# Patient Record
Sex: FEMALE | Race: WHITE | Employment: FULL TIME | URBAN - METROPOLITAN AREA
[De-identification: names, ages, dates, MRNs, and addresses within clinical notes are randomized per-mention and may not be internally consistent; named-entity substitution may affect disease eponyms.]

---

## 2018-05-17 ENCOUNTER — OFFICE VISIT (OUTPATIENT)
Dept: ORTHOPEDIC SURGERY | Age: 58
End: 2018-05-17

## 2018-05-17 VITALS — HEIGHT: 62 IN

## 2018-05-17 DIAGNOSIS — S46.812A TRAPEZIUS STRAIN, LEFT, INITIAL ENCOUNTER: ICD-10-CM

## 2018-05-17 DIAGNOSIS — M25.512 LEFT SHOULDER PAIN, UNSPECIFIED CHRONICITY: Primary | ICD-10-CM

## 2018-05-17 PROCEDURE — 99214 OFFICE O/P EST MOD 30 MIN: CPT | Performed by: ORTHOPAEDIC SURGERY

## 2018-05-17 RX ORDER — METHYLPREDNISOLONE 4 MG/1
TABLET ORAL
Qty: 1 KIT | Refills: 0 | Status: SHIPPED | OUTPATIENT
Start: 2018-05-17

## 2018-05-17 RX ORDER — MELOXICAM 15 MG/1
15 TABLET ORAL DAILY
Qty: 30 TABLET | Refills: 3 | Status: SHIPPED | OUTPATIENT
Start: 2018-05-17

## 2019-02-18 ENCOUNTER — APPOINTMENT (OUTPATIENT)
Dept: GENERAL RADIOLOGY | Age: 59
End: 2019-02-18
Payer: COMMERCIAL

## 2019-02-18 ENCOUNTER — HOSPITAL ENCOUNTER (EMERGENCY)
Age: 59
Discharge: HOME | End: 2019-02-18
Payer: COMMERCIAL

## 2019-02-18 VITALS
HEART RATE: 76 BPM | RESPIRATION RATE: 18 BRPM | SYSTOLIC BLOOD PRESSURE: 153 MMHG | BODY MASS INDEX: 29.44 KG/M2 | HEIGHT: 62 IN | WEIGHT: 160 LBS | TEMPERATURE: 97.9 F | OXYGEN SATURATION: 100 % | DIASTOLIC BLOOD PRESSURE: 78 MMHG

## 2019-02-18 DIAGNOSIS — S43.005A DISLOCATED SHOULDER, LEFT, INITIAL ENCOUNTER: Primary | ICD-10-CM

## 2019-02-18 PROCEDURE — 6370000000 HC RX 637 (ALT 250 FOR IP): Performed by: PHYSICIAN ASSISTANT

## 2019-02-18 PROCEDURE — 73030 X-RAY EXAM OF SHOULDER: CPT

## 2019-02-18 PROCEDURE — 73020 X-RAY EXAM OF SHOULDER: CPT

## 2019-02-18 PROCEDURE — 99283 EMERGENCY DEPT VISIT LOW MDM: CPT

## 2019-02-18 RX ORDER — HYDROCODONE BITARTRATE AND ACETAMINOPHEN 5; 325 MG/1; MG/1
1 TABLET ORAL ONCE
Status: COMPLETED | OUTPATIENT
Start: 2019-02-18 | End: 2019-02-18

## 2019-02-18 RX ORDER — HYDROCODONE BITARTRATE AND ACETAMINOPHEN 5; 325 MG/1; MG/1
1 TABLET ORAL EVERY 6 HOURS PRN
Qty: 10 TABLET | Refills: 0 | Status: SHIPPED | OUTPATIENT
Start: 2019-02-18 | End: 2019-02-21

## 2019-02-18 RX ADMIN — HYDROCODONE BITARTRATE AND ACETAMINOPHEN 1 TABLET: 5; 325 TABLET ORAL at 20:46

## 2019-02-18 ASSESSMENT — PAIN SCALES - GENERAL
PAINLEVEL_OUTOF10: 9
PAINLEVEL_OUTOF10: 9

## 2020-12-08 ENCOUNTER — APPOINTMENT (OUTPATIENT)
Dept: GENERAL RADIOLOGY | Age: 60
End: 2020-12-08
Payer: COMMERCIAL

## 2020-12-08 ENCOUNTER — HOSPITAL ENCOUNTER (EMERGENCY)
Age: 60
Discharge: HOME | End: 2020-12-08
Attending: EMERGENCY MEDICINE
Payer: COMMERCIAL

## 2020-12-08 VITALS
OXYGEN SATURATION: 99 % | WEIGHT: 176 LBS | HEART RATE: 80 BPM | TEMPERATURE: 98.9 F | DIASTOLIC BLOOD PRESSURE: 75 MMHG | HEIGHT: 62 IN | RESPIRATION RATE: 16 BRPM | BODY MASS INDEX: 32.39 KG/M2 | SYSTOLIC BLOOD PRESSURE: 154 MMHG

## 2020-12-08 PROCEDURE — 99284 EMERGENCY DEPT VISIT MOD MDM: CPT

## 2020-12-08 PROCEDURE — 73030 X-RAY EXAM OF SHOULDER: CPT

## 2020-12-08 PROCEDURE — 6370000000 HC RX 637 (ALT 250 FOR IP): Performed by: EMERGENCY MEDICINE

## 2020-12-08 RX ORDER — IBUPROFEN 200 MG
600 TABLET ORAL ONCE
Status: COMPLETED | OUTPATIENT
Start: 2020-12-08 | End: 2020-12-08

## 2020-12-08 RX ADMIN — IBUPROFEN 600 MG: 200 TABLET, FILM COATED ORAL at 17:33

## 2020-12-08 ASSESSMENT — PAIN DESCRIPTION - PAIN TYPE: TYPE: ACUTE PAIN

## 2020-12-08 ASSESSMENT — PAIN SCALES - GENERAL
PAINLEVEL_OUTOF10: 3
PAINLEVEL_OUTOF10: 10

## 2020-12-08 ASSESSMENT — PAIN DESCRIPTION - DESCRIPTORS: DESCRIPTORS: ACHING

## 2020-12-08 ASSESSMENT — PAIN DESCRIPTION - ORIENTATION: ORIENTATION: LEFT

## 2020-12-08 ASSESSMENT — PAIN DESCRIPTION - LOCATION: LOCATION: SHOULDER

## 2020-12-08 NOTE — ED TRIAGE NOTES
Chief Complaint   Patient presents with    Shoulder Pain     pt states believes she dislocated left shoulder reaching for a box around 1630

## 2020-12-08 NOTE — LETTER
[place] [facility]  [address] [address]  Phone: [phone]             December 8, 2020    Patient: Errol Rosas   YOB: 1960   Date of Visit: 12/8/2020       To Whom It May Concern:    Yovany Martinez was seen and treated in our emergency department on 12/8/2020. She may return to work on 12/10/2020.       Sincerely,             Signature:__________________________________

## 2020-12-10 NOTE — ED PROVIDER NOTES
[address] ED    CHIEF COMPLAINT  Shoulder Pain (pt states believes she dislocated left shoulder reaching for a box around 1630)       HISTORY OF PRESENT ILLNESS  Erika Montaño is a 64 y.o. female who presents to the ED with complaint of left shoulder pain. Patient reports a history of chronic dislocations of the left shoulder. She states she was reaching for a box when she developed pain in the left shoulder and limited range of motion. The pain is moderate, worse with movement, better with rest, no radiation, no associated symptoms. Patient is otherwise without complaint. No other complaints, modifying factors or associated symptoms. I have reviewed the following from the nursing documentation:    Past Medical History:   Diagnosis Date    COPD (chronic obstructive pulmonary disease) ([facility] [address]     History of gastroesophageal reflux (GERD) 2  years    Hypertension     Nausea & vomiting      Past Surgical History:   Procedure Laterality Date    HYSTERECTOMY      UPPER GASTROINTESTINAL ENDOSCOPY  12/27/10     Family History   Problem Relation Age of Onset    Heart Disease Paternal Grandfather      Social History     Socioeconomic History    Marital status:       Spouse name: Not on file    Number of children: Not on file    Years of education: Not on file    Highest education level: Not on file   Occupational History    Not on file   Social Needs    Financial resource strain: Not on file    Food insecurity     Worry: Not on file     Inability: Not on file    Transportation needs     Medical: Not on file     Non-medical: Not on file   Tobacco Use    Smoking status: Former Smoker     Last attempt to quit: 2010     Years since quittin.9    Smokeless tobacco: Never Used   Substance and Sexual Activity    Alcohol use: No     Alcohol/week: 2.0 standard drinks     Types: 2 Cans of beer per week    Drug use: No    Sexual activity: Not on file   Lifestyle    Physical activity     Days per week: Not on file     Minutes per session: Not on file    Stress: Not on file   Relationships    Social connections     Talks on phone: Not on file     Gets together: Not on file     Attends Manda[employer] service: Not on file     Active member of club or organization: Not on file     Attends meetings of clubs or organizations: Not on file     Relationship status: Not on file    Intimate partner violence     Fear of current or ex partner: Not on file     Emotionally abused: Not on file     Physically abused: Not on file     Forced sexual activity: Not on file   Other Topics Concern    Not on file   Social History Narrative    Not on file     No current facility-administered medications for this encounter. Current Outpatient Medications   Medication Sig Dispense Refill    methylPREDNISolone (MEDROL, EVARISTO,) 4 MG tablet TAKE BY MOUTH AS DIRECTED 1 kit 0    meloxicam (MOBIC) 15 MG tablet Take 1 tablet by mouth daily 30 tablet 3    naproxen (NAPROSYN) 375 MG tablet Take 1 tablet by mouth 2 times daily for 10 days 20 tablet 0    albuterol (PROVENTIL HFA;VENTOLIN HFA) 108 (90 BASE) MCG/ACT inhaler Inhale 2 puffs into the lungs 4 times daily.  aspirin 81 MG EC tablet Take 81 mg by mouth daily.  esomeprazole (NEXIUM) 40 MG capsule Take 40 mg by mouth 2 times daily.  lisinopril (PRINIVIL;ZESTRIL) 20 MG tablet Take 40 mg by mouth daily.  alprazolam (XANAX) 0.25 MG tablet Take 1 mg by mouth 3 times daily as needed. No Known Allergies    REVIEW OF SYSTEMS  10 systems reviewed, pertinent positives and negatives per HPI, otherwise noted to be negative.     PHYSICAL EXAM  ED Triage Vitals [12/08/20 1711]   Enc Vitals Group      BP (!) 155/78      Pulse 78      Resp 16      Temp 98.9 °F (37.2 °C)      Temp Source Oral      SpO2 99 %      Weight 176 lb (79.8 kg)      Height 5' 2\" (1.575 m)      Head Circumference       Peak Flow       Pain Score       Pain Loc       Pain Edu? Excl. in [address]? General appearance: Awake and alert. Cooperative. No acute distress. HENT: Normocephalic. Atraumatic. Mucous membranes are moist.  Neck: Trachea midline  Eyes: PERRL. EOMI. Heart/Chest: RRR. No murmurs. Lungs: Respirations unlabored. CTAB. Good air exchange. Speaking comfortably in full sentences. Abdomen: Soft. Non-tender. Non-distended. No rebound or guarding. Musculoskeletal: The left upper extremity is held in abduction and internal rotation. Left cardinal hand functions are intact. Sensation is intact R/Tala/U.  2+ radial pulse. Skin: Warm and dry. No acute rashes. Neurological: Alert and oriented. CN II-XII intact. Psychiatric: Mood/affect: normal      LABS  I have reviewed all labs for this visit. No results found for this visit on 12/08/20. RADIOLOGY  I have reviewed all radiographic studies for this visit. XR SHOULDER LEFT (MIN 2 VIEWS)   Final Result   Interval reduction of glenohumeral dislocation with no definite fracture. XR SHOULDER LEFT (MIN 2 VIEWS)   Final Result   Anterior and inferior dislocation of the humeral head relative to the glenoid. ED COURSE/MDM  Patient seen and evaluated. Old records reviewed. Labs and imaging reviewed and results discussed with patient/family to extent possible. Patient presents with dislocation of the left shoulder. Patient is neurovascularly intact on arrival.  X-ray confirms anterior/inferior dislocation of the left shoulder. Preparations were made for closed reduction, the patient felt as if the dislocation spontaneously reduced. X-rays confirmed the spontaneous reduction. Patient's pain was treated with ibuprofen to good effect. Patient intends to follow with orthopedic surgery. Placed in sling. Discharged home with usual strict return precautions.     During the patient's ED course, the patient was given:  Medications   ibuprofen (ADVIL;MOTRIN) tablet 600 mg (600 mg Oral Given 12/8/20 [id number])        All questions were answered and the patient/family expressed understanding and agreement with the plan. PROCEDURES  None    CRITICAL CARE  N/A    CLINICAL IMPRESSION  1. Closed anterior dislocation of left shoulder, initial encounter        DISPOSITION   discharge     Cale De La Cruz MD    Note: This chart was created using voice recognition dictation software. Efforts were made by me to ensure accuracy, however some errors may be present due to limitations of this technology and occasionally words are not transcribed correctly.         Cale De La Cruz MD  12/10/20 0939

## 2021-05-15 ENCOUNTER — HOSPITAL ENCOUNTER (OUTPATIENT)
Dept: ULTRASOUND IMAGING | Age: 61
Discharge: HOME | End: 2021-05-15
Payer: COMMERCIAL

## 2021-05-15 DIAGNOSIS — R10.11 RUQ PAIN: ICD-10-CM

## 2021-05-15 PROCEDURE — 76700 US EXAM ABDOM COMPLETE: CPT

## 2021-07-13 ENCOUNTER — HOSPITAL ENCOUNTER (OUTPATIENT)
Age: 61
Discharge: HOME | End: 2021-07-13
Payer: COMMERCIAL

## 2021-07-13 ENCOUNTER — HOSPITAL ENCOUNTER (OUTPATIENT)
Dept: GENERAL RADIOLOGY | Age: 61
Discharge: HOME | End: 2021-07-13
Payer: COMMERCIAL

## 2021-07-13 DIAGNOSIS — M25.571 CHRONIC PAIN OF RIGHT ANKLE: ICD-10-CM

## 2021-07-13 DIAGNOSIS — G89.29 CHRONIC PAIN OF RIGHT ANKLE: ICD-10-CM

## 2021-07-13 PROCEDURE — 73610 X-RAY EXAM OF ANKLE: CPT

## 2023-04-10 ENCOUNTER — HOSPITAL ENCOUNTER (EMERGENCY)
Age: 63
Discharge: HOME | End: 2023-04-10
Payer: COMMERCIAL

## 2023-04-10 VITALS
HEIGHT: 62 IN | SYSTOLIC BLOOD PRESSURE: 157 MMHG | RESPIRATION RATE: 17 BRPM | HEART RATE: 86 BPM | WEIGHT: 187 LBS | OXYGEN SATURATION: 98 % | DIASTOLIC BLOOD PRESSURE: 82 MMHG | TEMPERATURE: 98.2 F | BODY MASS INDEX: 34.41 KG/M2

## 2023-04-10 DIAGNOSIS — R04.0 EPISTAXIS: Primary | ICD-10-CM

## 2023-04-10 DIAGNOSIS — R03.0 ELEVATED BLOOD PRESSURE READING: ICD-10-CM

## 2023-04-10 DIAGNOSIS — J30.2 SEASONAL ALLERGIES: ICD-10-CM

## 2023-04-10 PROCEDURE — 99283 EMERGENCY DEPT VISIT LOW MDM: CPT

## 2023-04-10 PROCEDURE — 6370000000 HC RX 637 (ALT 250 FOR IP): Performed by: PHYSICIAN ASSISTANT

## 2023-04-10 RX ORDER — CETIRIZINE HYDROCHLORIDE 10 MG/1
10 TABLET ORAL DAILY
Qty: 30 TABLET | Refills: 0 | Status: SHIPPED | OUTPATIENT
Start: 2023-04-10 | End: 2023-05-10

## 2023-04-10 RX ORDER — OXYMETAZOLINE HYDROCHLORIDE 0.05 G/100ML
2 SPRAY NASAL ONCE
Status: COMPLETED | OUTPATIENT
Start: 2023-04-10 | End: 2023-04-10

## 2023-04-10 RX ADMIN — OXYMETAZOLINE HYDROCHLORIDE 2 SPRAY: 0.05 SPRAY NASAL at 22:13

## 2023-04-10 ASSESSMENT — PAIN - FUNCTIONAL ASSESSMENT
PAIN_FUNCTIONAL_ASSESSMENT: NONE - DENIES PAIN
PAIN_FUNCTIONAL_ASSESSMENT: NONE - DENIES PAIN

## 2023-04-11 NOTE — ED PROVIDER NOTES
[address] ED  EMERGENCY DEPARTMENT ENCOUNTER        Pt Name: Campbell Heart  MRN: [id number]  [place] 1960  Date of evaluation: 4/10/2023  Provider: BRITT Alexander  PCP: Pcp No  Note Started: 8:19 PM EDT 4/10/23      JERAMIE. I have evaluated this patient. My supervising physician was available for consultation. CHIEF COMPLAINT       Chief Complaint   Patient presents with    Epistaxis     Right nostril bleeding started yesterday but today it got worse. HISTORY OF PRESENT ILLNESS: 1 or more Elements     History from : Patient    Limitations to history : None    Campbell Heart is a 63 y.o. female with past medical history of depression, anxiety, GERD, COPD who presents via private vehicle from her home for evaluation of epistaxis. She notes it began yesterday afternoon and lasted about an hour. She notes this afternoon it started again and has been coming and going. She denies any recent trauma. No blood thinner use. She does get seasonal allergies and does not take anything for them. Nursing Notes were all reviewed and agreed with or any disagreements were addressed in the HPI. REVIEW OF SYSTEMS :      Review of Systems    Positives and Pertinent negatives as per HPI.      SURGICAL HISTORY     Past Surgical History:   Procedure Laterality Date    HYSTERECTOMY      UPPER GASTROINTESTINAL ENDOSCOPY  12/27/10       CURRENTMEDICATIONS       Discharge Medication List as of 4/10/2023 10:07 PM        CONTINUE these medications which have NOT CHANGED    Details   methylPREDNISolone (MEDROL, EVARISTO,) 4 MG tablet TAKE BY MOUTH AS DIRECTED, Disp-1 kit, R-0Normal      meloxicam (MOBIC) 15 MG tablet Take 1 tablet by mouth daily, Disp-30 tablet, R-3Normal      naproxen (NAPROSYN) 375 MG tablet Take 1 tablet by mouth 2 times daily for 10 days, Disp-20 tablet, R-0Print      albuterol (PROVENTIL HFA;VENTOLIN HFA) 108 (90 BASE) MCG/ACT inhaler Inhale 2 puffs into the lungs 4 times

## 2023-04-11 NOTE — DISCHARGE INSTRUCTIONS
NOSEBLEEDS      Bleeding from the nose commonly occurs due to an injury, or drying and cracking of the inner lining of the nose (especially when you are suffering with a cold or hayfever). High blood pressure and hardening of the arteries are other causes of nosebleeds. Home Care:    1. If packing was put in your nose, do not pull on it or try to remove it yourself. You must make an appointment to have it removed within 2 days. 2. Do not blow your nose for 12 hours after the bleeding stops. Do not pick your nose, because it may cause the bleeding to begin again. Avoid physicial straining (for example, heavy lifting, straining at bowel movement). 3. Do not drink hot liquids for the next 2 days. This dilates the blood vessels in your nose and may restart the bleeding. 4. Do not take aspirin, products containing asprin, or alcohol for the next week. 5. If the bleeding begins again, sit up and lean forward to prevent blood from dripping to the back of your throat. Pinch the soft part of your nose together tightly for a full 10-15 minutes. 6. If high blood pressure was a cause for your nose to bleed, call your doctor to have your blood pressure re-schecked to see if you require medication. 7. If you have a cold or allergy, apply a small amount of Vaseline with a Q-tip inside the nose twice a day (morning and night) to prevent drying.  (After the packing has been removed). Do not insert the Q-tip in the nose past the cotton tip. 8. In winter months avoid excessive hot and dry air by using a home humidifier. Follow-up:    See your doctor if your nose continues to bleed over the next 48 hours.     Return to the Emergency Department if any of the following occurs:    * If your nose has been packed and it starts to rebleed, return                 immediately    * Dizziness, weakness, or fainting    * You develop a fever over 101° F    * Facial pain

## 2025-03-08 ENCOUNTER — HOSPITAL ENCOUNTER (OUTPATIENT)
Age: 65
Discharge: HOME | End: 2025-03-08
Payer: COMMERCIAL

## 2025-03-08 LAB
BASOPHILS # BLD: 0.1 K/UL (ref 0–0.2)
BASOPHILS NFR BLD: 0.9 %
DEPRECATED RDW RBC AUTO: 13.4 % (ref 12.4–15.4)
EOSINOPHIL # BLD: 0.1 K/UL (ref 0–0.6)
EOSINOPHIL NFR BLD: 2.3 %
HCT VFR BLD AUTO: 43.7 % (ref 36–48)
HGB BLD-MCNC: 14.6 G/DL (ref 12–16)
LYMPHOCYTES # BLD: 2.1 K/UL (ref 1–5.1)
LYMPHOCYTES NFR BLD: 34.8 %
MCH RBC QN AUTO: 31.3 PG (ref 26–34)
MCHC RBC AUTO-ENTMCNC: 33.4 G/DL (ref 31–36)
MCV RBC AUTO: 93.9 FL (ref 80–100)
MONOCYTES # BLD: 0.3 K/UL (ref 0–1.3)
MONOCYTES NFR BLD: 5.7 %
NEUTROPHILS # BLD: 3.4 K/UL (ref 1.7–7.7)
NEUTROPHILS NFR BLD: 56.3 %
PLATELET # BLD AUTO: 297 K/UL (ref 135–450)
PMV BLD AUTO: 8.5 FL (ref 5–10.5)
RBC # BLD AUTO: 4.65 M/UL (ref 4–5.2)
WBC # BLD AUTO: 6 K/UL (ref 4–11)

## 2025-03-08 PROCEDURE — 84443 ASSAY THYROID STIM HORMONE: CPT

## 2025-03-08 PROCEDURE — 80053 COMPREHEN METABOLIC PANEL: CPT

## 2025-03-08 PROCEDURE — 80061 LIPID PANEL: CPT

## 2025-03-08 PROCEDURE — 83036 HEMOGLOBIN GLYCOSYLATED A1C: CPT

## 2025-03-08 PROCEDURE — 85025 COMPLETE CBC W/AUTO DIFF WBC: CPT

## 2025-03-09 LAB
ALBUMIN SERPL-MCNC: 4.6 G/DL (ref 3.4–5)
ALBUMIN/GLOB SERPL: 1.7 {RATIO} (ref 1.1–2.2)
ALP SERPL-CCNC: 82 U/L (ref 40–129)
ALT SERPL-CCNC: 26 U/L (ref 10–40)
ANION GAP SERPL CALCULATED.3IONS-SCNC: 11 MMOL/L (ref 3–16)
AST SERPL-CCNC: 26 U/L (ref 15–37)
BILIRUB SERPL-MCNC: 0.4 MG/DL (ref 0–1)
BUN SERPL-MCNC: 10 MG/DL (ref 7–20)
CALCIUM SERPL-MCNC: 10.1 MG/DL (ref 8.3–10.6)
CHLORIDE SERPL-SCNC: 100 MMOL/L (ref 99–110)
CHOLEST SERPL-MCNC: 230 MG/DL (ref 0–199)
CO2 SERPL-SCNC: 30 MMOL/L (ref 21–32)
CREAT SERPL-MCNC: 0.9 MG/DL (ref 0.6–1.2)
EST. AVERAGE GLUCOSE BLD GHB EST-MCNC: 119.8 MG/DL
GFR SERPLBLD CREATININE-BSD FMLA CKD-EPI: 72 ML/MIN/{1.73_M2}
GLUCOSE SERPL-MCNC: 115 MG/DL (ref 70–99)
HBA1C MFR BLD: 5.8 %
HDLC SERPL-MCNC: 56 MG/DL (ref 40–60)
LDLC SERPL CALC-MCNC: 142 MG/DL
POTASSIUM SERPL-SCNC: 4.3 MMOL/L (ref 3.5–5.1)
PROT SERPL-MCNC: 7.3 G/DL (ref 6.4–8.2)
SODIUM SERPL-SCNC: 141 MMOL/L (ref 136–145)
TRIGL SERPL-MCNC: 158 MG/DL (ref 0–150)
TSH SERPL DL<=0.005 MIU/L-ACNC: 2.05 UIU/ML (ref 0.27–4.2)
VLDLC SERPL CALC-MCNC: 32 MG/DL